# Patient Record
Sex: FEMALE | Race: WHITE | NOT HISPANIC OR LATINO | URBAN - METROPOLITAN AREA
[De-identification: names, ages, dates, MRNs, and addresses within clinical notes are randomized per-mention and may not be internally consistent; named-entity substitution may affect disease eponyms.]

---

## 2021-05-25 ENCOUNTER — RECORDS - HEALTHEAST (OUTPATIENT)
Dept: ADMINISTRATIVE | Facility: CLINIC | Age: 41
End: 2021-05-25

## 2021-06-02 ENCOUNTER — RECORDS - HEALTHEAST (OUTPATIENT)
Dept: ADMINISTRATIVE | Facility: CLINIC | Age: 41
End: 2021-06-02

## 2021-06-05 ENCOUNTER — RECORDS - HEALTHEAST (OUTPATIENT)
Dept: SCHEDULING | Facility: CLINIC | Age: 41
End: 2021-06-05

## 2021-06-05 DIAGNOSIS — G95.0 SYRINGOMYELIA AND SYRINGOBULBIA (H): ICD-10-CM

## 2021-06-05 DIAGNOSIS — G93.5 COMPRESSION OF BRAIN (H): ICD-10-CM

## 2021-10-05 ENCOUNTER — HOSPITAL ENCOUNTER (EMERGENCY)
Facility: CLINIC | Age: 41
End: 2021-10-05

## 2023-04-18 ENCOUNTER — TELEPHONE (OUTPATIENT)
Dept: NEUROSURGERY | Facility: CLINIC | Age: 43
End: 2023-04-18

## 2023-04-18 NOTE — TELEPHONE ENCOUNTER
Patient is asking for a letter from Dr Mancilla stating she cannot fly on air planes   And that she cannot sit for long periods of time while traveling.  Please call patient when note is ready

## 2023-04-18 NOTE — TELEPHONE ENCOUNTER
Left a detail message for Gianna stating we are unable to provided a letter as we have not seen her since 2014.  Awilda Castaneda RN, CNRN

## 2023-04-19 ENCOUNTER — TELEPHONE (OUTPATIENT)
Dept: NEUROSURGERY | Facility: CLINIC | Age: 43
End: 2023-04-19